# Patient Record
Sex: MALE | Race: WHITE
[De-identification: names, ages, dates, MRNs, and addresses within clinical notes are randomized per-mention and may not be internally consistent; named-entity substitution may affect disease eponyms.]

---

## 2019-04-12 ENCOUNTER — HOSPITAL ENCOUNTER (OUTPATIENT)
Dept: HOSPITAL 84 - D.OPS | Age: 69
Discharge: HOME | End: 2019-04-12
Attending: INTERNAL MEDICINE
Payer: COMMERCIAL

## 2019-04-12 VITALS
BODY MASS INDEX: 36.36 KG/M2 | WEIGHT: 254 LBS | WEIGHT: 254 LBS | HEIGHT: 70 IN | BODY MASS INDEX: 36.36 KG/M2 | HEIGHT: 70 IN

## 2019-04-12 VITALS — SYSTOLIC BLOOD PRESSURE: 133 MMHG | DIASTOLIC BLOOD PRESSURE: 86 MMHG

## 2019-04-12 DIAGNOSIS — K29.80: ICD-10-CM

## 2019-04-12 DIAGNOSIS — Z01.812: ICD-10-CM

## 2019-04-12 DIAGNOSIS — K31.7: ICD-10-CM

## 2019-04-12 DIAGNOSIS — K64.8: ICD-10-CM

## 2019-04-12 DIAGNOSIS — K29.70: ICD-10-CM

## 2019-04-12 DIAGNOSIS — Z86.010: ICD-10-CM

## 2019-04-12 DIAGNOSIS — K44.9: Primary | ICD-10-CM

## 2019-04-12 DIAGNOSIS — N18.9: ICD-10-CM

## 2019-04-12 DIAGNOSIS — I25.10: ICD-10-CM

## 2019-04-12 DIAGNOSIS — D64.9: ICD-10-CM

## 2019-04-12 DIAGNOSIS — Z80.0: ICD-10-CM

## 2019-04-12 LAB
ALBUMIN SERPL-MCNC: 3.6 G/DL (ref 3.4–5)
ALP SERPL-CCNC: 83 U/L (ref 46–116)
ALT SERPL-CCNC: 32 U/L (ref 10–68)
ANION GAP SERPL CALC-SCNC: 13.1 MMOL/L (ref 8–16)
BILIRUB SERPL-MCNC: 0.68 MG/DL (ref 0.2–1.3)
BUN SERPL-MCNC: 35 MG/DL (ref 7–18)
CALCIUM SERPL-MCNC: 9.1 MG/DL (ref 8.5–10.1)
CHLORIDE SERPL-SCNC: 97 MMOL/L (ref 98–107)
CO2 SERPL-SCNC: 29 MMOL/L (ref 21–32)
CREAT SERPL-MCNC: 2 MG/DL (ref 0.6–1.3)
ERYTHROCYTE [DISTWIDTH] IN BLOOD BY AUTOMATED COUNT: 13.5 % (ref 11.5–14.5)
GLOBULIN SER-MCNC: 4.1 G/L
GLUCOSE SERPL-MCNC: 138 MG/DL (ref 74–106)
HCT VFR BLD CALC: 37.5 % (ref 42–54)
HGB BLD-MCNC: 13.4 G/DL (ref 13.5–17.5)
LYMPHOCYTES NFR BLD AUTO: 25.9 % (ref 15–50)
MCH RBC QN AUTO: 34.7 PG (ref 26–34)
MCHC RBC AUTO-ENTMCNC: 35.7 G/DL (ref 31–37)
MCV RBC: 97.2 FL (ref 80–100)
NEUTROPHILS NFR BLD AUTO: 60 % (ref 40–80)
OSMOLALITY SERPL CALC.SUM OF ELEC: 281 MOSM/KG (ref 275–300)
PLATELET # BLD: 230 10X3/UL (ref 130–400)
PMV BLD AUTO: 10.2 FL (ref 7.4–10.4)
POTASSIUM SERPL-SCNC: 3.1 MMOL/L (ref 3.5–5.1)
PROT SERPL-MCNC: 7.7 G/DL (ref 6.4–8.2)
RBC # BLD AUTO: 3.86 10X6/UL (ref 4.2–6.1)
SODIUM SERPL-SCNC: 136 MMOL/L (ref 136–145)
WBC # BLD AUTO: 7.8 10X3/UL (ref 4.8–10.8)

## 2019-04-16 NOTE — OP
PATIENT NAME:  JANE ABURTO                             MEDICAL RECORD: [id number]
:[birth year]                                             LOCATION:D.OPS          
                                                         ADMISSION DATE:        
SURGEON:  EMILY BENÍTEZ MD          
 
 
DATE OF OPERATION:  2019
 
PROCEDURE:  EGD with biopsy and colonoscopy.
 
REFERRING PHYSICIAN:  Dr. Iker Goldman.
 
NEPHROLOGIST:  Dr. Trena Coates
 
CARDIOLOGIST:  Dr. Dylan Phillips.
 
INDICATIONS:  Mr. Aburto is a delightful 68-year-old gentleman with a history of
anemia, abdominal pain, colon polyps, and a family history of colon cancer.  His
hemoglobin today is 13.4 with an MCV of 97.2.  He presents for outpatient EGD
and colonoscopy.
 
PREMEDICATIONS:  Total IV anesthesia (chronic renal insufficiency, coronary
artery disease), propofol 420 mg.
 
INSTRUMENT:  [employer] video gastroscope and [employer] video colonoscope.
 
PROCEDURE AND FINDINGS:  After receiving informed consent, Mr. Aburto's posterior
pharynx was anesthetized with Cetacaine spray.  He was placed in left lateral
decubitus position and prepared for EGD.  He was sedated per anesthesia.  After
achieving an adequate level of sedation, the gastroscope was introduced per
orally and advanced into the duodenum without difficulty.  The esophageal mucosa
was without erythema, ulcers, strictures, masses, appeared normal down the GE
junction.  Small sliding type hiatal hernia was present.  Gastric mucosa was
notable for mild prepyloric and antral erythema and antral biopsies were
obtained to rule out Helicobacter pylori.  In the fundus was a 0.3 cm sessile
polyp that was biopsied.  There were no polyps or erythema noted in the mucosa
of the body of the stomach.  Pylorus was patent and competent.  Duodenal mucosa
was without erythema or ulcers and appeared normal through the second portion. 
Biopsies were taken from the second portion of the duodenum to rule out celiac
disease.  Gastroscope was then withdrawn.  He was prepared for colonoscopy.
 
Digital rectal exam was performed that showed no external hemorrhoidal tags,
fissures, or fistulas.  Normal sphincter tone, no palpable rectal masses. 
Colonoscope was introduced per rectally and advanced to the cecum without
difficulty.  The cecum, IC valve, and appendiceal orifice were identified and
appeared normal.  As the colonoscope was withdrawn, careful inspection was made
of the walls of the colon.  Overall mucosa had normal vascular and fold pattern.
 No polyps, masses, ulcers, or diverticula were noted.  There was no blood seen
in the colon.  Retroflexion in the rectum showed mild internal hemorrhoids.  A
good prep was present.  Withdrawal time was 6 minutes.  Mr. Aburto tolerated the
procedure well, no immediate complications.
 
ASSESSMENT:
1.  Small sliding type hiatal hernia.
2.  Mild gastritis.
3.  Gastric fundal polyp.
4.  Mild internal hemorrhoids.
5.  Anemia, suspect a chronic renal insufficiency (creatinine is 2) contributing
 
 
 
OPERATIVE REPORT                               [id number]    CONCHISJANE           
 
 
to the mild anemia.
6.  Family history of colon cancer.
 
RECOMMENDATIONS:  Follow up histopathology.  Recommend surveillance colonoscopy
in 5 years.
 
TRANSINT:[id number] Voice Confirmation ID: [id number] DOCUMENT ID: [id number]
                                           
                                           EMILY BENÍTEZ MD          
 
 
 
Electronically Signed by EMILY BENÍTEZ on 19 at 1017
 
 
 
 
 
 
 
 
 
 
 
 
 
 
 
 
 
 
 
 
 
 
 
 
 
 
 
 
 
 
 
 
 
 
CC: TRENA COATES FINCH, RICHARD R and DYLAN FALLON MD        [id number]
DICTATION DATE: 19 0959     :     19 1216      [facility] 
                                                                      19
[id number] [place] [address]

## 2019-06-06 ENCOUNTER — HOSPITAL ENCOUNTER (OUTPATIENT)
Dept: HOSPITAL 84 - D.OPS | Age: 69
Discharge: HOME | End: 2019-06-06
Attending: SURGERY
Payer: MEDICARE

## 2019-06-06 VITALS — WEIGHT: 259 LBS | BODY MASS INDEX: 37.08 KG/M2 | HEIGHT: 70 IN

## 2019-06-06 VITALS — SYSTOLIC BLOOD PRESSURE: 117 MMHG | DIASTOLIC BLOOD PRESSURE: 18 MMHG

## 2019-06-06 DIAGNOSIS — E78.00: ICD-10-CM

## 2019-06-06 DIAGNOSIS — Z01.812: ICD-10-CM

## 2019-06-06 DIAGNOSIS — E11.9: ICD-10-CM

## 2019-06-06 DIAGNOSIS — R22.2: Primary | ICD-10-CM

## 2019-06-06 DIAGNOSIS — M10.9: ICD-10-CM

## 2019-06-06 DIAGNOSIS — I10: ICD-10-CM

## 2019-06-06 LAB
ANION GAP SERPL CALC-SCNC: 13 MMOL/L (ref 8–16)
BUN SERPL-MCNC: 39 MG/DL (ref 7–18)
CALCIUM SERPL-MCNC: 9.9 MG/DL (ref 8.5–10.1)
CHLORIDE SERPL-SCNC: 101 MMOL/L (ref 98–107)
CO2 SERPL-SCNC: 29.1 MMOL/L (ref 21–32)
CREAT SERPL-MCNC: 2.1 MG/DL (ref 0.6–1.3)
ERYTHROCYTE [DISTWIDTH] IN BLOOD BY AUTOMATED COUNT: 13.7 % (ref 11.5–14.5)
GLUCOSE SERPL-MCNC: 106 MG/DL (ref 74–106)
HCT VFR BLD CALC: 36.6 % (ref 42–54)
HGB BLD-MCNC: 12.4 G/DL (ref 13.5–17.5)
MCH RBC QN AUTO: 34.2 PG (ref 26–34)
MCHC RBC AUTO-ENTMCNC: 33.9 G/DL (ref 31–37)
MCV RBC: 100.8 FL (ref 80–100)
OSMOLALITY SERPL CALC.SUM OF ELEC: 287 MOSM/KG (ref 275–300)
PLATELET # BLD: 207 10X3/UL (ref 130–400)
PMV BLD AUTO: 10.9 FL (ref 7.4–10.4)
POTASSIUM SERPL-SCNC: 3.1 MMOL/L (ref 3.5–5.1)
RBC # BLD AUTO: 3.63 10X6/UL (ref 4.2–6.1)
SODIUM SERPL-SCNC: 140 MMOL/L (ref 136–145)
WBC # BLD AUTO: 8.4 10X3/UL (ref 4.8–10.8)

## 2019-06-07 NOTE — OP
PATIENT NAME:  JANE BALDERRAMA                             MEDICAL RECORD: [id number]
:[birth year]                                             LOCATION:D.[facility]          
                                                         ADMISSION DATE:        
SURGEON:  BOY MONTENEGRO MD          
 
 
DATE OF OPERATION:  2019
 
PREOPERATIVE DIAGNOSES:
1.  Abdominal wall mass.
2.  Hypertension.
3.  Hypercholesterolemia.
4.  Diabetes mellitus.
5.  Gout.
 
POSTOPERATIVE DIAGNOSES:
1.  Abdominal wall mass.
2.  Hypertension.
3.  Hypercholesterolemia.
4.  Diabetes mellitus.
5.  Gout.
 
PROCEDURE:  Incision and debridement of abdominal wall mass.
 
SURGEON:  Boy Montenegro MD
 
REPORT OF PROCEDURE:  The patient's abdomen was prepped and draped in sterile
fashion.  There is a firm mass in the upper midline that have extended out
bilaterally.  The midline incision was reopened using a 15-blade and we were
able to penetrate this firm cavity.  The external aspect of this cavity was firm
like bone.  Once we got inside, there was murky appearing fluid, but no sign of
any purulence.  We extended this opening until I could see inside of the pocket.
 What we actually found was that the pocket was under the rectus muscles and
overlying the mesh.  The mesh appeared to be densely attached to its lateral
aspects in all directions, but was not attached in its middle portion.  There
was a lot of calcification in this pocket.  Once we got all the fluid out of the
way, then we started to try to remove as much of the calcifications possible. 
This was done with curette and blunt dissection.  We were eventually able to get
most of this calcification down until the tissue felt more normal.  The mesh was
never penetrated during this portion of the procedure and so we never actually
entered the abdominal cavity.  At no point were the bowels ever seen.  Once we
had everything as clean as possible in all directions, we irrigated out the
wound with normal saline followed by peroxide and saline solution.  The mesh
appeared to be intact with no signs of any chronic inflammatory changes or any
signs of infection.  The overlying muscle tissue and fascia appeared to be
viable and there was no sign of any bleeding.  We then inserted a 15-Amharic
fully-fluted Vito drain through the left upper quadrant to where it rested in
the pocket.  This was sutured into place with a 2-0 Prolene.  The midline fascia
was then closed using a running 0 PDS times 2.  We then reapproximated the
subcutaneous tissues with interrupted 3-0 Vicryl and infused 10 mL of 0.25%
Marcaine with epinephrine.  The skin was closed with staples and then dressed
appropriately.
 
COMPLICATIONS:  None.
 
CONDITION:  Stable.
 
ANESTHESIA:  General endotracheal and local.
 
 
 
OPERATIVE REPORT                               [id number]    CONCHISJANE RAY           
 
 
 
BLOOD LOSS:  30 mL.
 
TRANSINT:[id number] Voice Confirmation ID: [id number] DOCUMENT ID: [id number]
                                           
                                           BOY MONTENEGRO MD          
 
 
 
Electronically Signed by BOY MONTENEGRO on 19 at 1009
 
 
 
 
 
 
 
 
 
 
 
 
 
 
 
 
 
 
 
 
 
 
 
 
 
 
 
 
 
 
 
 
 
 
 
 
 
CC: CELSO HARRISON                                             [id number]
DICTATION DATE: 19 1021     :     19 1442      [facility] [place] 
                                                                      19
[facility]                                          
[address] [id number]

## 2019-06-13 ENCOUNTER — HOSPITAL ENCOUNTER (OUTPATIENT)
Dept: HOSPITAL 84 - D.HCCARDIO | Age: 69
Discharge: HOME | End: 2019-06-13
Attending: INTERNAL MEDICINE
Payer: MEDICARE

## 2019-06-13 VITALS — BODY MASS INDEX: 37.2 KG/M2

## 2019-06-13 DIAGNOSIS — I48.91: Primary | ICD-10-CM

## 2019-06-17 NOTE — EC
PATIENT:JANE BALDERRAMA                   DATE OF SERVICE: 06/13/19
SEX: M                                  MEDICAL RECORD: [id number]
DATE OF BIRTH: [birth year]                        [facility]          
AGE OF PATIENT: 68                             ADMISSION DATE: 06/13/19
 
REFERRING PHYSICIAN:                               
 
INTERPRETING PHYSICIAN: SOMMER FALLON MD          
 
 
 
                             ECHOCARDIOGRAM REPORT
  ECHO CHARGES 4               ECHO COMPLETE                 Date: 06/13/19
 
 
 
CLINICAL DIAGNOSIS: PVC'S                         
                    H/O HTN                       
                         ECHOCARDIOGRAPHIC MEASUREMENTS
      (adult normal given)
   AC root (d.<3.7cm) 3.0  cm   LV Septum d (<1.2 cm> 1.0  cm
      Valve Excursion 1.3  cm     LV Septum (systole) 1.5  cm
Left Atria (s.<4.0cm> 2.7  cm          LVPW d(<1.2cm) 1.0  cm
        RV (d.<2.3cm) 2.9  cm           LVPW (sytole) 1.4  cm
  LV diastole(<5.6CM) 4.0  cm       MV E-F(>70mm/sec)      cm
           LV systole 2.9  cm           LVOT Diameter 2.0  cm
       MV exc.(>10mm)      cm
Est.ejection fraction (50-75%)     %
 
   DOPPLER:
     LVIT      cm/sec A 60.0 cm/sec E 78.0  cm/sec
       LA      cm/sec      RVSP 14.2 mmHg
     LVOT 123  cm/sec   AOP1/2T      m/s
  Asc. Ao 126  cm/sec
     RVOT 61.0 cm/sec
       RA      cm/sec
       PA 93.0 cm/sec
 AV Gradient Peak 6.4  mmHg  AV Mean 3.5  mmHg  AV Area 2.5  cm
 MV Gradient Peak 2.7  mmHg  MV Mean 1.3  mmHg  MV Area      cm
   COMMENTS: OP - HC                                      
 
 
 Cardiac Sonographer: Basilio VEGA LACY            
      Cardiologist: 3          Dr. Phillips             
             TAPE# PACS           
                                       Pericardial Effusion Y                        
 
 
DATE OF SERVICE:  06/13/2019
 
Adequate 2-D echo, color-flow and spectral Doppler, and M-mode.
 
No LVH.  LV internal dimensions are normal.  Wall motion is normal.  EF is
greater than or equal to 55%.  Aortic valve is tricuspid.  No evidence of
stenosis by Doppler interrogation.  Left atrium is normal at 3.7 cm.  Mitral
valve shows no prolapse.  Trace MR.  Right-sided chambers are normal.  Trace TR.
 
TRANSINT:[id number] Voice Confirmation ID: [id number] DOCUMENT ID: [id number]
 
 
 
ECHOCARDIOGRAM REPORT                          [id number]    JANE BALDERRAMA,SOMMER JUÁREZ MD          
 
 
 
Electronically Signed by SOMMER FALLON on 06/17/19 at 1503
 
 
 
 
 
 
 
 
 
 
 
 
 
 
 
 
 
 
 
 
 
 
 
 
 
 
 
 
 
 
 
 
 
 
 
 
 
 
 
 
CC:                                                             [id number]
DICTATION DATE: 06/17/19 1233     :     06/17/19 1241      DEP CLI 
                                                                      06/13/19
[id number] [phone]

## 2020-02-24 ENCOUNTER — HOSPITAL ENCOUNTER (OUTPATIENT)
Dept: HOSPITAL 84 - D.MAMMO | Age: 70
Discharge: HOME | End: 2020-02-24
Attending: FAMILY MEDICINE
Payer: MEDICARE

## 2020-02-24 VITALS — BODY MASS INDEX: 37.2 KG/M2

## 2020-02-24 DIAGNOSIS — N63.20: Primary | ICD-10-CM

## 2020-06-10 ENCOUNTER — HOSPITAL ENCOUNTER (OUTPATIENT)
Dept: HOSPITAL 84 - D.HCCECHO | Age: 70
Discharge: HOME | End: 2020-06-10
Attending: INTERNAL MEDICINE
Payer: MEDICARE

## 2020-06-10 VITALS — BODY MASS INDEX: 37.2 KG/M2

## 2020-06-10 DIAGNOSIS — I10: Primary | ICD-10-CM

## 2020-06-12 NOTE — EC
PATIENT:JANE BALDERRAMA                   DATE OF SERVICE: 06/10/20
SEX: M                                  MEDICAL RECORD: [id number]
DATE OF BIRTH: [birth year]                        LOCATION:[facility]          
AGE OF PATIENT: 69                             ADMISSION DATE: 06/10/20
 
REFERRING PHYSICIAN:                               
 
INTERPRETING PHYSICIAN: SOMMER FALLON MD          
 
 
 
                             ECHOCARDIOGRAM REPORT
  ECHO CHARGES 4               ECHO COMPLETE                 Date: 06/10/20
 
 
 
CLINICAL DIAGNOSIS: HTN  HX OF CAD/STENTS         
 
                         ECHOCARDIOGRAPHIC MEASUREMENTS
      (adult normal given)
   AC root (d.<3.7cm) 4.0  cm   LV Septum d (<1.2 cm> 1.6  cm
      Valve Excursion 1.9  cm     LV Septum (systole) 1.8  cm
Left Atria (s.<4.0cm> 3.8  cm          LVPW d(<1.2cm) 1.8  cm
        RV (d.<2.3cm) 3.4  cm           LVPW (sytole) 2.0  cm
  LV diastole(<5.6CM) 4.7  cm       MV E-F(>70mm/sec)      cm
           LV systole 3.0  cm           LVOT Diameter 2.2  cm
       MV exc.(>10mm) 1.7  cm
Est.ejection fraction (50-75%)     %
 
   DOPPLER:
     LVIT      cm/sec A 77.0 cm/sec E 89.0  cm/sec
       LA      cm/sec      RVSP 14   mmHg
     LVOT 116  cm/sec   AOP1/2T      m/s
  Asc. Ao 128  cm/sec
     RVOT 67   cm/sec
       RA      cm/sec
         cm/sec
 AV Gradient Peak 6.52 mmHg  AV Mean 3.24 mmHg  AV Area 3.8  cm
 MV Gradient Peak 3.05 mmHg  MV Mean 1.41 mmHg  MV Area      cm
   COMMENTS:                                              
 
 
 Cardiac Sonographer: 2               ELOISA QUIÑONEZ              
      Cardiologist: 3          Dr. Phillips             
             TAPE# PACS           
                                       Pericardial Effusion N                        
 
 
DATE OF SERVICE:  
 
Adequate 2D, color flow imaging, spectral Doppler, and M-Mode 
 
LVH is present.  LV internal dimensions are normal.  Wall motion is normal.  EF
is greater than or equal to 55%.  Aortic valve is tricuspid.  No evidence of
stenosis by Doppler interrogation.  Left atrium is normal.  Mitral valve shows
no prolapse.  Trace MR.  Right-sided chambers are grossly normal.  Trace TR.
 
TRANSINT:[id number] Voice Confirmation ID: [id number] DOCUMENT ID: [id number]
 
 
 
ECHOCARDIOGRAM REPORT                          [id number]    CONCHISJANE MAY,SOMMER JUÁREZ MD          
 
 
 
Electronically Signed by SOMMER FALLON on 06/12/20 at 0851
 
 
 
 
 
 
 
 
 
 
 
 
 
 
 
 
 
 
 
 
 
 
 
 
 
 
 
 
 
 
 
 
 
 
 
 
 
 
 
 
CC:                                                             [id number]
DICTATION DATE: 06/11/20 1427     :     06/11/20 1511      DEP CLI 
                                                                      06/10/20
[facility]                                          
[address] [place] [address]